# Patient Record
Sex: MALE | NOT HISPANIC OR LATINO | Employment: FULL TIME | URBAN - NONMETROPOLITAN AREA
[De-identification: names, ages, dates, MRNs, and addresses within clinical notes are randomized per-mention and may not be internally consistent; named-entity substitution may affect disease eponyms.]

---

## 2024-05-14 ENCOUNTER — OFFICE VISIT (OUTPATIENT)
Dept: URGENT CARE | Facility: CLINIC | Age: 24
End: 2024-05-14
Payer: COMMERCIAL

## 2024-05-14 VITALS
OXYGEN SATURATION: 98 % | HEIGHT: 71 IN | TEMPERATURE: 97 F | HEART RATE: 68 BPM | BODY MASS INDEX: 25.51 KG/M2 | DIASTOLIC BLOOD PRESSURE: 72 MMHG | WEIGHT: 182.2 LBS | SYSTOLIC BLOOD PRESSURE: 112 MMHG | RESPIRATION RATE: 16 BRPM

## 2024-05-14 DIAGNOSIS — Z02.4 DRIVER'S PERMIT PHYSICAL EXAMINATION: Primary | ICD-10-CM

## 2024-05-14 NOTE — PROGRESS NOTES
[facility]        NAME: Edd Kincaid is a 23 y.o. male  : 2000    MRN: [id number]  DATE: May 14, 2024  TIME: 7:09 PM    Assessment and Plan   's permit physical examination [Z02.4]  1. 's permit physical examination          Normal  permit physical exam.    Patient Instructions       Chief Complaint     Chief Complaint   Patient presents with    Physical Exam      [place] [id number]  's permit         History of Present Illness       23-year-old male presents to the clinic for 's physical.  Denies pertinent past medical history, daily medications, current symptoms.  Denies history of cardiovascular disease, hypertension, diabetes, seizures.        Review of Systems   Review of Systems   Constitutional:  Negative for chills and fever.   HENT:  Negative for ear pain and sore throat.    Eyes:  Negative for pain and visual disturbance.   Respiratory:  Negative for cough and shortness of breath.    Cardiovascular:  Negative for chest pain and palpitations.   Gastrointestinal:  Negative for abdominal pain and vomiting.   Endocrine: Negative.    Genitourinary:  Negative for dysuria and hematuria.   Musculoskeletal:  Negative for arthralgias and back pain.   Skin:  Negative for color change and rash.   Neurological:  Negative for seizures and syncope.   Hematological: Negative.    Psychiatric/Behavioral: Negative.     All other systems reviewed and are negative.        Current Medications     No current outpatient medications on file.    Current Allergies     Allergies as of 2024    (No Known Allergies)            The following portions of the patient's history were reviewed and updated as appropriate: allergies, current medications, past family history, past medical history, past social history, past surgical history and problem list.     History reviewed. No pertinent past medical history.    Past Surgical History:   Procedure Laterality Date    APPENDECTOMY    "      No family history on file.      Medications have been verified.        Objective   /72   Pulse 68   Temp (!) 97 °F (36.1 °C)   Resp 16   Ht 5' 10.5\" (1.791 m)   Wt 82.6 kg (182 lb 3.2 oz)   SpO2 98%   BMI 25.77 kg/m²        Physical Exam     Physical Exam  Constitutional:       General: He is not in acute distress.     Appearance: Normal appearance. He is not ill-appearing, toxic-appearing or diaphoretic.   HENT:      Head: Normocephalic and atraumatic.      Right Ear: Tympanic membrane, ear canal and external ear normal.      Left Ear: Tympanic membrane, ear canal and external ear normal.      Nose: Nose normal.      Mouth/Throat:      Mouth: Mucous membranes are moist.      Pharynx: Oropharynx is clear.   Eyes:      Extraocular Movements: Extraocular movements intact.      Conjunctiva/sclera: Conjunctivae normal.      Pupils: Pupils are equal, round, and reactive to light.   Cardiovascular:      Rate and Rhythm: Normal rate and regular rhythm.      Pulses: Normal pulses.      Heart sounds: Normal heart sounds. No murmur heard.     No friction rub. No gallop.   Pulmonary:      Effort: Pulmonary effort is normal. No respiratory distress.      Breath sounds: Normal breath sounds. No stridor. No wheezing, rhonchi or rales.   Chest:      Chest wall: No tenderness.   Abdominal:      General: Abdomen is flat. Bowel sounds are normal. There is no distension.      Palpations: Abdomen is soft. There is no mass.      Tenderness: There is no abdominal tenderness. There is no right CVA tenderness, left CVA tenderness, guarding or rebound.   Musculoskeletal:         General: Normal range of motion.      Cervical back: Normal range of motion and neck supple. No tenderness.   Lymphadenopathy:      Cervical: No cervical adenopathy.   Skin:     General: Skin is warm and dry.      Capillary Refill: Capillary refill takes less than 2 seconds.   Neurological:      General: No focal deficit present.      Mental " Status: He is alert and oriented to person, place, and time. Mental status is at baseline.      Cranial Nerves: No cranial nerve deficit.      Sensory: No sensory deficit.      Motor: No weakness.      Coordination: Coordination normal.      Gait: Gait normal.      Deep Tendon Reflexes: Reflexes normal.   Psychiatric:         Mood and Affect: Mood normal.         Behavior: Behavior normal.         Thought Content: Thought content normal.